# Patient Record
Sex: MALE | Race: WHITE | Employment: FULL TIME | URBAN - METROPOLITAN AREA
[De-identification: names, ages, dates, MRNs, and addresses within clinical notes are randomized per-mention and may not be internally consistent; named-entity substitution may affect disease eponyms.]

---

## 2020-04-10 ENCOUNTER — VIRTUAL VISIT (OUTPATIENT)
Dept: NEUROLOGY | Age: 70
End: 2020-04-10

## 2020-04-10 VITALS — WEIGHT: 270 LBS | HEIGHT: 74 IN | BODY MASS INDEX: 34.65 KG/M2

## 2020-04-10 DIAGNOSIS — G25.0 BENIGN ESSENTIAL TREMOR: Primary | ICD-10-CM

## 2020-04-10 RX ORDER — LOSARTAN POTASSIUM 50 MG/1
50 TABLET ORAL DAILY
COMMUNITY

## 2020-04-10 RX ORDER — MULTIVIT WITH MINERALS/HERBS
1 TABLET ORAL DAILY
COMMUNITY

## 2020-04-10 RX ORDER — CLINDAMYCIN HYDROCHLORIDE 150 MG/1
150 CAPSULE ORAL EVERY 6 HOURS
COMMUNITY

## 2020-04-10 RX ORDER — METFORMIN HYDROCHLORIDE 500 MG/1
750 TABLET ORAL
COMMUNITY

## 2020-04-10 RX ORDER — MELOXICAM 7.5 MG/1
7.5 TABLET ORAL 2 TIMES DAILY
COMMUNITY

## 2020-04-10 RX ORDER — PRASTERONE (DHEA) 50 MG
50 TABLET ORAL
COMMUNITY

## 2020-04-10 RX ORDER — VITAMIN E 1000 UNIT
1000 CAPSULE ORAL
COMMUNITY

## 2020-04-10 NOTE — PROGRESS NOTES
Chief Complaint   Patient presents with    Tremors       Referred by: Dr Darcie MUÑOZ    Mr. Pablo Ott is a 77-year-old gentleman with a history of degenerative spine disease who is here to discuss tremor. He has had a right hand tremor going on 5 years that he thinks is getting a little bit worse such that his handwriting is difficult and sometimes he spills liquids. No radiation to the left side or leg or head. No drooling or constipation. No hallucinations. 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 Right arm has a history of carpal tunnel cubital tunnel release. No falls. -------------------------------------------------------------------------------------------------------------------------------------------------------------------------------------------------------------------------------------------------  This is a telemedicine visit that was performed with the originating site at the patients's HOME and the distant site at [facility] outpatient Neurology clinic at [facility] in [place], [place]. Verbal consent to participate in the video visit was obtained and 2 factor identification was completed (name and  verified). This visit occurred during the [address] emergency and a telemedicine visit was done in lieu of an office visit. I discussed with the patient the nature of our telemedicine visit is that:  - I would evaluate the patient and recommend diagnostic and treatment based on my assessment  - Our sessions are not being recorded and that personal health information is protected  - Our team will provide follow-up care in person if when the patient needs it. Review of Systems   Gastrointestinal: Negative for constipation. Musculoskeletal: Negative for falls. Neurological: Positive for tremors. Psychiatric/Behavioral: Negative for hallucinations. All other systems reviewed and are negative.       Past Medical History: Diagnosis Date    Essential hypertension     borderline     Family History   Problem Relation Age of Onset    Cancer Mother     Coronary Artery Disease Neg Hx      Social History     Socioeconomic History    Marital status:      Spouse name: Not on file    Number of children: Not on file    Years of education: Not on file    Highest education level: Not on file   Occupational History    Not on file   Social Needs    Financial resource strain: Not on file    Food insecurity     Worry: Not on file     Inability: Not on file    Transportation needs     Medical: Not on file     Non-medical: Not on file   Tobacco Use    Smoking status: Former Smoker     Last attempt to quit: 1984     Years since quittin.2    Smokeless tobacco: Never Used   Substance and Sexual Activity    Alcohol use: Yes     Alcohol/week: 2.0 standard drinks     Types: 2 Cans of beer per week     Comment: every other day.  Drug use: No    Sexual activity: Not Currently   Lifestyle    Physical activity     Days per week: Not on file     Minutes per session: Not on file    Stress: Not on file   Relationships    Social connections     Talks on phone: Not on file     Gets together: Not on file     Attends Evangelical service: Not on file     Active member of club or organization: Not on file     Attends meetings of clubs or organizations: Not on file     Relationship status: Not on file    Intimate partner violence     Fear of current or ex partner: Not on file     Emotionally abused: Not on file     Physically abused: Not on file     Forced sexual activity: Not on file   Other Topics Concern    Not on file   Social History Narrative    Not on file     Current Outpatient Medications   Medication Sig    b complex vitamins tablet Take 1 Tab by mouth daily.  multivit-min/FA/lycopen/lutein (CENTRUM SILVER MEN PO) Take  by mouth.  prasterone, dhea, (DHEA) 50 mg tab Take 50 mg by mouth.     MAGNESIUM CARBONATE PO Take  by mouth.  meloxicam (MOBIC) 7.5 mg tablet Take 7.5 mg by mouth two (2) times a day.  glucosamine/chondr bustillos A sod (OSTEO BI-FLEX PO) Take  by mouth.  ascorbic acid, vitamin C, (Vitamin C) 1,000 mg tablet Take 1,000 mg by mouth.  cholecalciferol, vitamin D3, (VITAMIN D3 PO) Take  by mouth.  metFORMIN (GLUCOPHAGE) 500 mg tablet Take 750 mg by mouth daily (with breakfast). METFORMIN 750MG    losartan (COZAAR) 50 mg tablet Take 50 mg by mouth daily.  clindamycin (CLEOCIN) 150 mg capsule Take 150 mg by mouth every six (6) hours. USES BEFORE DENTAL PROCEDURES    Fish Oil-Omega-3 Fatty Acids (FISH OIL) 360-1,200 mg cap Take 4 Tabs by mouth daily.  magnesium 250 mg Tab Take 1 Tab by mouth daily.  potassium gluconate 550 mg Tab Take 1 Tab by mouth daily.  levothyroxine (SYNTHROID) 75 mcg tablet Take 75 mcg by mouth daily.  coenzyme Q-10 (CO Q-10) 200 mg capsule Take  by mouth daily.  testosterone enanthate 200 mg/mL Syrg 200 mg by IntraMUSCular route every fourteen (14) days.  traMADol (ULTRAM) 50 mg tablet Take 50 mg by mouth daily as needed.  diclofenac EC (VOLTAREN) 75 mg EC tablet Take 75 mg by mouth two (2) times a day.  loratadine (CLARITIN) 10 mg tablet Take 10 mg by mouth daily.  HYDROcodone-acetaminophen 5-500 mg cap Take 1 Tab by mouth nightly as needed.  OTHER Ketoprofen 20 %, Tramadol 5% Baclofen 3 % Cyclobenzaprine 2 % lotion once daily as needed     No current facility-administered medications for this visit. Allergies   Allergen Reactions    Penicillins Swelling     Pt states this is no longer true. Neurologic Exam     Mental Status   Oriented to person, place, and time. Good volume of speech     Cranial Nerves   Cranial nerves II through XII intact. Motor Exam     Strength   Strength 5/5 throughout.      Sensory Exam   Light touch normal.     Gait, Coordination, and Reflexes     Gait  Gait: normal (Non-shuffling)    Physical Exam Constitutional: He is oriented to person, place, and time. He appears well-developed and well-nourished. Musculoskeletal:      Comments: Very arthritic fingers more on the right   Neurological: He is oriented to person, place, and time. He has normal strength. Gait normal.   Psychiatric: His behavior is normal.     Visit Vitals  Ht 6' 2\" (1.88 m)   Wt 122.5 kg (270 lb)   BMI 34.67 kg/m²       No results found for: WBC, WBCT, WBCPOC, HGB, HGBPOC, HCT, HCTPOC, PLT, PLTPOC, MCV, MCVPOC, HGBEXT, HCTEXT, PLTEXT  Lab Results   Component Value Date/Time    Creatinine (POC) 0.9 12/05/2015 09:47 AM      No results found for: CHOL, CHOLPOCT, HDL, LDL, LDLC, LDLCPOC, LDLCEXT, TRIGL, TGLPOCT, CHHD, CHHDX  No results found for: GPT, ALTPOC, ALT, SGOT, ASTPOC, GGT, AP, APIT, APX, CBIL, TBIL, TBILI, ALB, ALBPOC, TP, NH3, NH4, INR, INREXT, PTP, PTINR, PTEXT, PLT, PLTPOC, HCABQL, HBSAG, AFP, INREXT, PTEXT, PLTEXT       CT Results (maximum last 3): No results found for this or any previous visit. MRI Results (maximum last 3): Results from [place] encounter on 12/05/15   MRI LUMB SPINE W WO CONT    Narrative **Final Report**       ICD Codes / Adm. Diagnosis: 724.02  V45.89 / Spinal stenosis, lumbar region    Other postprocedural status(  Examination:  MR AMANDA Quinn AND WO CON  - [id number] - Dec  5 2015 10:00AM  Accession No:  [id number]  Reason:  lumbar stenosis s/p lumbar laminectomy      REPORT:  Indication: Previous lumbar decompression at L4-L5. Evaluating for residual   stenosis    Exam: MRI of the lumbar spine. Sequences include sagittal and axial T1 and   T2-weighted images. Sagittal STIR. Coronal T2    Comparisons: July 18, 2015    Contrast: After the intravenous administration of 10 mL of Gadavist sagittal   and axial T1-weighted images were obtained. Findings: There is a levoconvex scoliosis of the lumbar spine with slight   anterolisthesis of L4 on L5 unchanged.  There is multilevel endplate   degenerative change. Patient is post right L4 laminectomy. Cord terminus is   within normal limits. Postoperative collections are noted posterior to L4   and L5 within the laminectomy defect, not unexpected. T12-L1: There is degeneration of this disc with disc height loss and a small   disc bulge. There are left-sided facet degenerative changes with thickening   of the ligamentum flavum. There is narrowing of the left subarticular zone   with mild to moderate left foraminal narrowing. No change    L1-L2: There is disc height loss with degeneration of this disc and a   diffuse disc bulge. There are facet degenerative changes. Canal stenosis is   mild with narrowing of the left subarticular zone. There is mild to moderate   left foraminal narrowing. No change    L2-L3: There is near-complete disc height loss with degeneration of this   disc and a diffuse disc bulge. There is a right foraminal extrusion   extending cranially. There are bilateral facet degenerative change with   thickening of the ligamentum flavum. Canal stenosis is mild to moderate with   significant narrowing of the right greater than left subarticular zones. There is mild to moderate left and moderate right foraminal narrowing. Unchanged    L3-L4: This level has been decompressed. There is no residual canal   stenosis. There is residual disc material within the foramen. There are   facet degenerative changes. . There is severe right and moderate left   foraminal narrowing. No change    L4-L5: There is been interval decompression of this level. Diffuse disc   bulge with small amount of extruded disc material extending cranially   unchanged. Patient is post right laminectomy with resection of the   ligamentum flavum. There is residual facet arthropathy left greater than   right. Overall degree of canal stenosis is markedly improved with mild   residual narrowing of the canal. There is severe right and moderate to   severe left foraminal narrowing.     L5-S1: There are bilateral facet degenerative changes with a small disc   bulge. Small left foraminal extrusion extending cranially unchanged. High   signal structure within the right foramen is not visualized on the current   examination. There is narrowing of the subarticular zones left greater than   right without significant canal stenosis. Severe bilateral foraminal   narrowing unchanged. IMPRESSION:  1. Interval decompression at L4-L5 with mild residual canal stenosis. Remaining lumbar disc levels are unchanged without significant interval   progression of stenosis or degenerative change              Signing/Reading Doctor: Zeke Kam ([id number])    Approved: Zeke Kam ([id number])  Dec  7 2015  8:44AM                               Results from Hospital Encounter encounter on 07/18/15   MRI LUMB SPINE W WO CONT    Narrative **Final Report**       ICD Codes / Adm. Diagnosis: 724.02   / Spinal stenosis, lumbar region    Examination:  MR AMANDA Martel AND SOHEILA CON  - [id number] - Jul 18 2015 10:20AM  Accession No:  [id number]  Reason:  lumbar stenosis s/o endoscopic decompression      REPORT:  Indication: Lumbar stenosis post endoscopic decompression    Exam: MRI of the lumbar spine. Sequences include sagittal and axial T1 and   T2-weighted images. Sagittal STIR. Comparisons: November 15, 2014    Contrast: After the intravenous administration of 10 mL of Gadavist sagittal   T1 with and without fat saturation and axial T1-weighted images with fat   saturation were obtained. Findings: There is an S-shaped scoliosis of the lumbar spine. There is   multilevel endplate degenerative change. Patient is post right L3   laminectomy. Cord termination is normal posterior to the L1-L2. While the   patient has been decompressed at L3-L4 there is residual clumping of the   nerve roots however there is significant stenosis at L2-3 and L4-5. A mild   component of arachnoiditis cannot be excluded.  Paraspinous soft tissues are within normal limits. T12-L1: There is degeneration of this disc with a diffuse disc bulge. This   is asymmetric to the left. Canal stenosis is mild with narrowing of the left   subarticular zone with mild to moderate left foraminal narrowing. No change    L1-L2: There is degeneration of this disc with near-complete disc height   loss and a diffuse disc bulge. Canal stenosis is mild. There is mild to   moderate left foraminal narrowing. No interval change    L2-L3: There is disc height loss with degeneration of this disc with a   diffuse disc bulge. There are bilateral facet degenerative change with   thickening of the ligamentum flavum. Canal stenosis is mild with significant   bilateral subarticular zone stenosis. There is a superimposed right   paracentral disc extrusion extending cranially. This has extended into the   vertebral body and does not significantly result in lateral recess stenosis   posterior to L2. This is slightly more prominent than on the prior study. Overall canal stenosis is mild to moderate with narrowing of the bilateral   subarticular zones. There is moderate right and mild to moderate left   foraminal narrowing. Overall degree of stenosis unchanged    L3-L4: This level has been decompressed without residual canal narrowing. There is complete disc height loss with no significant central disc bulge. There is disc material extending into the inferior margin of the foramen   bilaterally and residual facet arthropathy. There is severe right and   moderate left foraminal narrowing. This is unchanged    L4-L5: There is a diffuse disc bulge with bilateral facet degenerative   change. Canal stenosis is moderate to severe with narrowing of the bilateral   subarticular zones. There is severe right and moderate to severe left   foraminal narrowing. No change    L5-S1: There is significant bilateral facet degenerative changes with   associated facet effusions.  There is degeneration of this disc with a small   disc bulge. There is a left foraminal extrusion with cranial extension   unchanged. There is a sliver of high signal extending posterior to the nerve   root within the right foramen. This may represent a small synovial cyst best   seen on sagittal image 3-3. This has increased in the interval as has the   facet effusion. Canal stenosis is minimal with narrowing of the bilateral   subarticular zones right greater than left. There is severe right and severe   left foraminal narrowing        IMPRESSION:  1. Postoperative changes at L3 without residual canal stenosis. 2. Progression of facet effusions L5-S1 with small right foraminal synovial   cyst resulting in progressive right foraminal stenosis at this level. 3. Canal stenosis most significant at L4-L5 with moderate to severe   narrowing of the canal unchanged              Signing/Reading Doctor: Irais Bradley ([id number])    Approved: Irais Bradley ([id number])  Jul 20 2015  8:33AM                               Results from Hospital Encounter encounter on 11/15/14   MRI LUMB SPINE WO CONT    Narrative **Final Report**       ICD Codes / Adm. Diagnosis: 780.96  729.2 / Generalized pain  Neuralgia,   neuritis, and radic  Examination:  MR L SPINE WO CON  - [id number] - Nov 15 2014  1:56PM  Accession No:  [id number]  Reason:  Pain /Radiculitis      REPORT:  INDICATION: Low back pain with pain through the buttocks on the outside of   the leg down to the right foot. Losing control of right leg and foot. Numbness in both feet. Radiculitis 724.4, pain 724.2  TECHNIQUE: Sagittal T1, T2, STIR and axial T1 and T2 weighted images of the   lumbar spine were obtained. COMPARISON: MRI lumbar spine 11/21/08  FINDINGS:  The distal spinal cord has normal contour and signal.  Interval progression of degenerative scoliosis. T12-L1: Chronic loss of disc space height with disc bulging and endplate   hypertrophy. Mild left facet arthrosis.  No significant spinal stenosis. Mild   left foramina stenosis. L1-L2:  Chronic near total loss of disc space height. Endplate osteophyte   formation. Minimal/mild spinal stenosis with mild left greater than right   foramina stenosis. L2-L3:  Broad-based posterior disc protrusion and endplate osteophyte   formation. Moderate facet arthrosis and hypertrophy. Mild/moderate spinal   stenosis with at least mild bilateral foramina stenosis. Mild progression   since 2008. L3-L4:  Near total loss of disc space height with chronic degenerative   endplate changes. Slight retrolisthesis of L3 on L4. Advanced chronic right   greater than left facet arthrosis. Moderate central stenosis greater in the   right subarticular zone with severe right foramina stenosis. Moderately   severe left foramina stenosis. Interval progression of degenerative changes   since 2008. L4-L5:  Chronic near total loss of disc space height with endplate   osteophyte formation. Slight anterolisthesis L4 on L5. Chronic facet   osteoarthrosis and hypertrophy. Moderate to moderately severe spinal   stenosis. Right greater than left foramina stenosis. Interval progression   since 2008. L5-S1:  Broad-based posterior disc bulge/protrusion and endplate osteophyte   formation greater on the left extending into the left foramen. Moderate   facet arthrosis and hypertrophy. Mild central stenosis. Severe left foramina   stenosis. Moderate right foramina stenosis. Mild interval progression. IMPRESSION:   1. Interval progression of multilevel facet arthrosis and degenerative disc   changes since 2008. 2. L4-L5 moderately severe spinal stenosis and right greater than left   foramina stenosis. 3. L3-L4 at least moderate stenosis with right greater than left foramina   stenosis. 4. L2-L3 mild/moderate stenosis and foramina stenosis. 5. L5-S1 left greater than right foramina stenosis.               Signing/Reading Doctor: Lety Florian ([id number])    Approved: Lety Florian ([id number])  Nov 16 2014  4:12PM                                   PET Results (maximum last 3): No results found for this or any previous visit. Assessment and Plan   Diagnoses and all orders for this visit:    1. Benign essential tremor      15-year-old gentleman with a right hand tremor that appears consistent with essential.  It does not display typical parkinsonian features nor does his clinical history suggest such. He tends to have some improvement with a little bit of alcohol. Tremor is worse when he is fatigued which is consistent with benign type. We talked at length about medications. He had been on primidone before but had side effects. I would recommend that unless he truly needs medication to defer. There are significant side effects associated and if his tremor gets worse we can always consider beta-blockers or reintroducing primidone slowly. He agrees with holding off for now. I would like to reevaluate him  after the new year in the office. Questions answered in detail. If anything changes let us know. I reviewed and decided to continue the current medications. This clinical note was dictated with an electronic dictation software that can make unintentional errors. If there are any questions, please contact me directly for clarification. A notice of this visit/encounter being completed has been sent electronically to the patient's PCP and/or referring provider.      2 [facility], [facility] [address]  Diplomate KARLY

## 2020-04-10 NOTE — LETTER
4/10/2020 Patient:  Angie Morales YOB: 1950 Date of Visit: 4/10/2020 Dear Hilda Masters DO 
[address] [facility] [place] [address] [id number] VIA Facsimile: [phone]: I was requested by Terry Whaley DO to evaluate Mr. Angie Morales  for Chief Complaint Patient presents with  Tremors Dylan Moreira I am recommending the following:  
 
Diagnoses and all orders for this visit: 1. Benign essential tremor 
 
 
 
---------------------------------------------------------------------------------------------------------------------- Below is my encounter: Chief Complaint Patient presents with  Tremors Referred by: Dr Alyssa MUÑOZ Mr. Navin Camilo is a 75-year-old gentleman with a history of degenerative spine disease who is here to discuss tremor. He has had a right hand tremor going on 5 years that he thinks is getting a little bit worse such that his handwriting is difficult and sometimes he spills liquids. No radiation to the left side or leg or head. No drooling or constipation. No hallucinations. 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 Right arm has a history of carpal tunnel cubital tunnel release. No falls. ------------------------------------------------------------------------------------------------------------------------------------------------------------------------------------------------------------------------------------------------- This is a telemedicine visit that was performed with the originating site at the patients's HOME and the distant site at [facility] outpatient Neurology clinic at [facility] in [place], [place]. Verbal consent to participate in the video visit was obtained and 2 factor identification was completed (name and  verified). This visit occurred during the [address] emergency and a telemedicine visit was done in lieu of an office visit. I discussed with the patient the nature of our telemedicine visit is that: - I would evaluate the patient and recommend diagnostic and treatment based on my assessment - Our sessions are not being recorded and that personal health information is protected - Our team will provide follow-up care in person if when the patient needs it. Review of Systems Gastrointestinal: Negative for constipation. Musculoskeletal: Negative for falls. Neurological: Positive for tremors. Psychiatric/Behavioral: Negative for hallucinations. All other systems reviewed and are negative. Past Medical History:  
Diagnosis Date  Essential hypertension   
 borderline Family History Problem Relation Age of Onset  Cancer Mother  Coronary Artery Disease Neg Hx Social History Socioeconomic History  Marital status:  Spouse name: Not on file  Number of children: Not on file  Years of education: Not on file  Highest education level: Not on file Occupational History  Not on file Social Needs  Financial resource strain: Not on file  Food insecurity Worry: Not on file Inability: Not on file  Transportation needs Medical: Not on file Non-medical: Not on file Tobacco Use  Smoking status: Former Smoker Last attempt to quit: 1984 Years since quittin.2  Smokeless tobacco: Never Used Substance and Sexual Activity  Alcohol use: Yes Alcohol/week: 2.0 standard drinks Types: 2 Cans of beer per week Comment: every other day.  Drug use: No  
 Sexual activity: Not Currently Lifestyle  Physical activity Days per week: Not on file Minutes per session: Not on file  Stress: Not on file Relationships  Social connections Talks on phone: Not on file Gets together: Not on file Attends Jainism service: Not on file Active member of club or organization: Not on file Attends meetings of clubs or organizations: Not on file Relationship status: Not on file  Intimate partner violence Fear of current or ex partner: Not on file Emotionally abused: Not on file Physically abused: Not on file Forced sexual activity: Not on file Other Topics Concern  Not on file Social History Narrative  Not on file Current Outpatient Medications Medication Sig  b complex vitamins tablet Take 1 Tab by mouth daily.  multivit-min/FA/lycopen/lutein (CENTRUM SILVER MEN PO) Take  by mouth.  prasterone, dhea, (DHEA) 50 mg tab Take 50 mg by mouth.  MAGNESIUM CARBONATE PO Take  by mouth.  meloxicam (MOBIC) 7.5 mg tablet Take 7.5 mg by mouth two (2) times a day.  glucosamine/chondr bustillos A sod (OSTEO BI-FLEX PO) Take  by mouth.  ascorbic acid, vitamin C, (Vitamin C) 1,000 mg tablet Take 1,000 mg by mouth.  cholecalciferol, vitamin D3, (VITAMIN D3 PO) Take  by mouth.  metFORMIN (GLUCOPHAGE) 500 mg tablet Take 750 mg by mouth daily (with breakfast). METFORMIN 750MG  losartan (COZAAR) 50 mg tablet Take 50 mg by mouth daily.  clindamycin (CLEOCIN) 150 mg capsule Take 150 mg by mouth every six (6) hours. USES BEFORE DENTAL PROCEDURES  
 Fish Oil-Omega-3 Fatty Acids (FISH OIL) 360-1,200 mg cap Take 4 Tabs by mouth daily.  magnesium 250 mg Tab Take 1 Tab by mouth daily.  potassium gluconate 550 mg Tab Take 1 Tab by mouth daily.  levothyroxine (SYNTHROID) 75 mcg tablet Take 75 mcg by mouth daily.  coenzyme Q-10 (CO Q-10) 200 mg capsule Take  by mouth daily.  testosterone enanthate 200 mg/mL Syrg 200 mg by IntraMUSCular route every fourteen (14) days.  traMADol (ULTRAM) 50 mg tablet Take 50 mg by mouth daily as needed.  diclofenac EC (VOLTAREN) 75 mg EC tablet Take 75 mg by mouth two (2) times a day.  loratadine (CLARITIN) 10 mg tablet Take 10 mg by mouth daily.  HYDROcodone-acetaminophen 5-500 mg cap Take 1 Tab by mouth nightly as needed.  OTHER Ketoprofen 20 %, Tramadol 5% Baclofen 3 % Cyclobenzaprine 2 % lotion once daily as needed No current facility-administered medications for this visit. Allergies Allergen Reactions  Penicillins Swelling Pt states this is no longer true. Neurologic Exam  
 
Mental Status Oriented to person, place, and time. Good volume of speech Cranial Nerves Cranial nerves II through XII intact. Motor Exam  
 
Strength Strength 5/5 throughout. Sensory Exam  
Light touch normal.  
 
Gait, Coordination, and Reflexes Gait Gait: normal (Non-shuffling) Physical Exam  
Constitutional: He is oriented to person, place, and time. He appears well-developed and well-nourished. Musculoskeletal:  
   Comments: Very arthritic fingers more on the right Neurological: He is oriented to person, place, and time. He has normal strength. Gait normal.  
Psychiatric: His behavior is normal.  
 
Visit Vitals Ht 6' 2\" (1.88 m) Wt 122.5 kg (270 lb) BMI 34.67 kg/m² No results found for: WBC, WBCT, WBCPOC, HGB, HGBPOC, HCT, HCTPOC, PLT, PLTPOC, MCV, MCVPOC, HGBEXT, HCTEXT, PLTEXT Lab Results Component Value Date/Time Creatinine (POC) 0.9 12/05/2015 09:47 AM  
  
No results found for: CHOL, CHOLPOCT, HDL, LDL, LDLC, LDLCPOC, LDLCEXT, TRIGL, TGLPOCT, CHHD, CHHDX No results found for: GPT, ALTPOC, ALT, SGOT, ASTPOC, GGT, AP, APIT, APX, CBIL, TBIL, TBILI, ALB, ALBPOC, TP, NH3, NH4, INR, INREXT, PTP, PTINR, PTEXT, PLT, PLTPOC, HCABQL, HBSAG, AFP, INREXT, PTEXT, PLTEXT 
  
 
CT Results (maximum last 3): No results found for this or any previous visit. MRI Results (maximum last 3): Results from Hospital Encounter encounter on 12/05/15 MRI LUMB SPINE W WO CONT Narrative **Final Report** 
  
 
ICD Codes / Adm. Diagnosis: 724.02  V45.89 / Spinal stenosis, lumbar region Other postprocedural status( 
Examination:  MR Rubia Navarro CON  - [id number] - Dec  5 2015 10:00AM 
Accession No:  [id number] Reason:  lumbar stenosis s/p lumbar laminectomy REPORT: 
Indication: Previous lumbar decompression at L4-L5. Evaluating for residual  
stenosis Exam: MRI of the lumbar spine. Sequences include sagittal and axial T1 and  
T2-weighted images. Sagittal STIR. Coronal T2 Comparisons: July 18, 2015 Contrast: After the intravenous administration of 10 mL of Gadavist sagittal  
and axial T1-weighted images were obtained. Findings: There is a levoconvex scoliosis of the lumbar spine with slight  
anterolisthesis of L4 on L5 unchanged. There is multilevel endplate  
degenerative change. Patient is post right L4 laminectomy. Cord terminus is  
within normal limits. Postoperative collections are noted posterior to L4  
and L5 within the laminectomy defect, not unexpected. T12-L1: There is degeneration of this disc with disc height loss and a small  
disc bulge. There are left-sided facet degenerative changes with thickening  
of the ligamentum flavum. There is narrowing of the left subarticular zone  
with mild to moderate left foraminal narrowing. No change L1-L2: There is disc height loss with degeneration of this disc and a  
diffuse disc bulge. There are facet degenerative changes. Canal stenosis is  
mild with narrowing of the left subarticular zone. There is mild to moderate  
left foraminal narrowing. No change L2-L3: There is near-complete disc height loss with degeneration of this  
disc and a diffuse disc bulge. There is a right foraminal extrusion  
extending cranially. There are bilateral facet degenerative change with  
thickening of the ligamentum flavum. Canal stenosis is mild to moderate with  
significant narrowing of the right greater than left subarticular zones. There is mild to moderate left and moderate right foraminal narrowing. Unchanged L3-L4: This level has been decompressed. There is no residual canal  
stenosis. There is residual disc material within the foramen. There are  
facet degenerative changes. . There is severe right and moderate left  
foraminal narrowing. No change L4-L5: There is been interval decompression of this level. Diffuse disc  
bulge with small amount of extruded disc material extending cranially  
unchanged. Patient is post right laminectomy with resection of the  
ligamentum flavum. There is residual facet arthropathy left greater than  
right. Overall degree of canal stenosis is markedly improved with mild  
residual narrowing of the canal. There is severe right and moderate to  
severe left foraminal narrowing. L5-S1: There are bilateral facet degenerative changes with a small disc  
bulge. Small left foraminal extrusion extending cranially unchanged. High  
signal structure within the right foramen is not visualized on the current  
examination. There is narrowing of the subarticular zones left greater than  
right without significant canal stenosis. Severe bilateral foraminal  
narrowing unchanged. IMPRESSION: 
1. Interval decompression at L4-L5 with mild residual canal stenosis. Remaining lumbar disc levels are unchanged without significant interval  
progression of stenosis or degenerative change Signing/Reading Doctor: Uriel WELSH ([id number]) Marika Woods ([id number])  Dec  7 2015  8:44AM                      
 
 
   
[place] [facility] [place] MRI LUMB SPINE W WO CONT Narrative **Final Report** 
  
 
ICD Codes / Adm. Diagnosis: 724.02   / Spinal stenosis, lumbar region Examination:  MR Phi Baker  - [id number] - Jul 18 2015 10:20AM 
Accession No:  [id number] Reason:  lumbar stenosis s/o endoscopic decompression REPORT: 
Indication: Lumbar stenosis post endoscopic decompression Exam: MRI of the lumbar spine. Sequences include sagittal and axial T1 and  
T2-weighted images. Sagittal STIR. Comparisons: November 15, 2014 Contrast: After the intravenous administration of 10 mL of Gadavist sagittal  
T1 with and without fat saturation and axial T1-weighted images with fat  
saturation were obtained. Findings: There is an S-shaped scoliosis of the lumbar spine. There is  
multilevel endplate degenerative change. Patient is post right L3  
laminectomy. Cord termination is normal posterior to the L1-L2. While the  
patient has been decompressed at L3-L4 there is residual clumping of the  
nerve roots however there is significant stenosis at L2-3 and L4-5. A mild  
component of arachnoiditis cannot be excluded. Paraspinous soft tissues are  
within normal limits. T12-L1: There is degeneration of this disc with a diffuse disc bulge. This  
is asymmetric to the left. Canal stenosis is mild with narrowing of the left  
subarticular zone with mild to moderate left foraminal narrowing. No change L1-L2: There is degeneration of this disc with near-complete disc height  
loss and a diffuse disc bulge. Canal stenosis is mild. There is mild to  
moderate left foraminal narrowing. No interval change L2-L3: There is disc height loss with degeneration of this disc with a  
diffuse disc bulge. There are bilateral facet degenerative change with  
thickening of the ligamentum flavum. Canal stenosis is mild with significant  
bilateral subarticular zone stenosis. There is a superimposed right  
paracentral disc extrusion extending cranially. This has extended into the  
vertebral body and does not significantly result in lateral recess stenosis  
posterior to L2. This is slightly more prominent than on the prior study. Overall canal stenosis is mild to moderate with narrowing of the bilateral  
subarticular zones. There is moderate right and mild to moderate left foraminal narrowing. Overall degree of stenosis unchanged L3-L4: This level has been decompressed without residual canal narrowing. There is complete disc height loss with no significant central disc bulge. There is disc material extending into the inferior margin of the foramen  
bilaterally and residual facet arthropathy. There is severe right and  
moderate left foraminal narrowing. This is unchanged L4-L5: There is a diffuse disc bulge with bilateral facet degenerative  
change. Canal stenosis is moderate to severe with narrowing of the bilateral  
subarticular zones. There is severe right and moderate to severe left  
foraminal narrowing. No change L5-S1: There is significant bilateral facet degenerative changes with  
associated facet effusions. There is degeneration of this disc with a small  
disc bulge. There is a left foraminal extrusion with cranial extension  
unchanged. There is a sliver of high signal extending posterior to the nerve  
root within the right foramen. This may represent a small synovial cyst best  
seen on sagittal image 3-3. This has increased in the interval as has the  
facet effusion. Canal stenosis is minimal with narrowing of the bilateral  
subarticular zones right greater than left. There is severe right and severe  
left foraminal narrowing IMPRESSION: 
1. Postoperative changes at L3 without residual canal stenosis. 2. Progression of facet effusions L5-S1 with small right foraminal synovial  
cyst resulting in progressive right foraminal stenosis at this level. 3. Canal stenosis most significant at L4-L5 with moderate to severe  
narrowing of the canal unchanged Signing/Reading Doctor: Eamon WELSH ([id number]) Ken Arango ([id number])  Jul 20 2015  8:33AM                      
 
 
   
Results from Hospital Encounter encounter on 11/15/14 MRI LUMB SPINE WO CONT  Narrative **Final Report** 
  
 
 ICD Codes / Adm. Diagnosis: 780.96  729.2 / Generalized pain  Neuralgia,  
neuritis, and radic Examination:  MR L SPINE WO CON  - [id number] - Nov 15 2014  1:56PM 
Accession No:  [id number] Reason:  Pain /Radiculitis REPORT: 
INDICATION: Low back pain with pain through the buttocks on the outside of  
the leg down to the right foot. Losing control of right leg and foot. Numbness in both feet. Radiculitis 724.4, pain 724.2 TECHNIQUE: Sagittal T1, T2, STIR and axial T1 and T2 weighted images of the  
lumbar spine were obtained. COMPARISON: MRI lumbar spine 11/21/08 FINDINGS: 
The distal spinal cord has normal contour and signal. 
Interval progression of degenerative scoliosis. T12-L1: Chronic loss of disc space height with disc bulging and endplate  
hypertrophy. Mild left facet arthrosis. No significant spinal stenosis. Mild  
left foramina stenosis. L1-L2:  Chronic near total loss of disc space height. Endplate osteophyte  
formation. Minimal/mild spinal stenosis with mild left greater than right  
foramina stenosis. L2-L3:  Broad-based posterior disc protrusion and endplate osteophyte  
formation. Moderate facet arthrosis and hypertrophy. Mild/moderate spinal  
stenosis with at least mild bilateral foramina stenosis. Mild progression  
since 2008. L3-L4:  Near total loss of disc space height with chronic degenerative  
endplate changes. Slight retrolisthesis of L3 on L4. Advanced chronic right  
greater than left facet arthrosis. Moderate central stenosis greater in the  
right subarticular zone with severe right foramina stenosis. Moderately  
severe left foramina stenosis. Interval progression of degenerative changes  
since 2008. L4-L5:  Chronic near total loss of disc space height with endplate  
osteophyte formation. Slight anterolisthesis L4 on L5. Chronic facet  
osteoarthrosis and hypertrophy.  Moderate to moderately severe spinal  
 stenosis. Right greater than left foramina stenosis. Interval progression  
since 2008. L5-S1:  Broad-based posterior disc bulge/protrusion and endplate osteophyte  
formation greater on the left extending into the left foramen. Moderate  
facet arthrosis and hypertrophy. Mild central stenosis. Severe left foramina  
stenosis. Moderate right foramina stenosis. Mild interval progression. IMPRESSION:  
1. Interval progression of multilevel facet arthrosis and degenerative disc  
changes since 2008. 2. L4-L5 moderately severe spinal stenosis and right greater than left  
foramina stenosis. 3. L3-L4 at least moderate stenosis with right greater than left foramina  
stenosis. 4. L2-L3 mild/moderate stenosis and foramina stenosis. 5. L5-S1 left greater than right foramina stenosis. Signing/Reading Doctor: Prakash Monroy ([id number]) Fiona Backer ([id number])  Nov 16 2014  4:12PM                      
 
 
   
 
 
PET Results (maximum last 3): No results found for this or any previous visit. Assessment and Plan Diagnoses and all orders for this visit: 1. Benign essential tremor 68-year-old gentleman with a right hand tremor that appears consistent with essential.  It does not display typical parkinsonian features nor does his clinical history suggest such. He tends to have some improvement with a little bit of alcohol. Tremor is worse when he is fatigued which is consistent with benign type. We talked at length about medications. He had been on primidone before but had side effects. I would recommend that unless he truly needs medication to defer. There are significant side effects associated and if his tremor gets worse we can always consider beta-blockers or reintroducing primidone slowly. He agrees with holding off for now. I would like to reevaluate him  after the new year in the office. Questions answered in detail. If anything changes let us know. I reviewed and decided to continue the current medications. This clinical note was dictated with an electronic dictation software that can make unintentional errors. If there are any questions, please contact me directly for clarification. Thank you for giving me the opportunity to assist in the care of Mr. Danna Ann. If you have questions, please do not hesitate to contact me. Sincerely, Concetta Márquez, DO Neurologist 
Brain Injury Medicine Diplomate ABPN

## 2020-04-10 NOTE — PROGRESS NOTES
New Pt, who has a tremor in his right hand. He has carpal tunnel and elbow surgery on this hand/arm.

## 2022-07-10 LAB — HBA1C MFR BLD HPLC: 5.3 %

## 2023-01-14 NOTE — PROGRESS NOTES
[place]: [facility]  [phone]    HPI:   Mr. Sal Valdez is a 73 yo M with HTN, remote tobacco, h/o spinal stenosis, arthritis, seen in the past for chest pains and shortness of breath. He is here now due to episodes of chest discomfort. This occurred a few weeks ago when he had a prolonged spell of chest discomfort that was substernal, lasted several minutes. He went and saw his primary care physician. He does note that he has been under a lot more stress the last six to eight months. His wife was diagnosed with Alzheimer's and he has had to do a lot more in the house and otherwise. He did have an episode of chest pressure as well today. He has had some congestion the last few weeks and did take an antibiotic for this that helped some. He has had two back surgeries over the years since I last saw him. He has not been as active physically because he has been taking care of his wife. He is compensated on exam with clear lungs and no lower extremity edema. His EKG is sinus bradycardia, heart rate of 56, first degree AV block, incomplete right bundle branch block. Assessment and Plan:   1. Unstable angina. Chest pain with typical and atypical features and multiple risk factors; will proceed with a stress test for further evaluation. Due to arthritis, his back and joints, he cannot fully complete a treadmill and will do this Lexiscan. 2. Essential hypertension. Blood pressure is controlled. 3. Mixed hyperlipidemia. 4. History of type 2 diabetes, but this resolved with weight loss. 5. DJD of the back, status post surgeries. 6. Arthritis. He  has a past medical history of Essential hypertension. He has no past medical history of Diabetes ([address] or Hyperlipidemia. All other systems negative except as above.      PE  Vitals:    01/17/23 0827   BP: (!) 154/90   Pulse: 69   Resp: 16   SpO2: 97%   Weight: 273 lb (123.8 kg)   Height: 6' 2\" (1.88 m)      Body mass index is 35.05 kg/m². General appearance - alert, well appearing, and in no distress  Mental status - affect appropriate to mood  Eyes - sclera anicteric, moist mucous membranes  Neck - supple, no JVD  Chest - clear to auscultation, no wheezes, rales or rhonchi  Heart - normal rate, regular rhythm, normal S1, S2, no murmurs, rubs, clicks or gallops  Abdomen - soft, nontender, nondistended, no masses or organomegaly  Extremities - peripheral pulses normal, no pedal edema      Recent Labs:  No results found for: CHOL    No results found for: ZAYRA    No results found for: BUN    No results found for: K    No results found for: HBA1C, RMA7WAYV    No components found for: HGBI,  IHGB,  HGB,  HGBP,  WBHGB    No results found for: PLT, PLTEXT      Reviewed:  Past Medical History:   Diagnosis Date    Essential hypertension     borderline     Social History     Tobacco Use   Smoking Status Former    Types: Cigarettes    Quit date: 1984    Years since quittin.0   Smokeless Tobacco Never     Social History     Substance and Sexual Activity   Alcohol Use Yes    Alcohol/week: 2.0 standard drinks    Types: 2 Cans of beer per week    Comment: every other day. Allergies   Allergen Reactions    Penicillins Swelling     Pt states this is no longer true. Current Outpatient Medications   Medication Sig    testosterone cypionate (DEPOTESTOTERONE CYPIONATE) 200 mg/mL injection INJECT 1 ML (CC) INTRAMUSCULARLY EVERY 2 WEEKS AS DIRECTED    propranoloL (INDERAL) 20 mg tablet Take 20 mg by mouth daily. montelukast (SINGULAIR) 10 mg tablet Take 10 mg by mouth daily. amLODIPine (NORVASC) 5 mg tablet TAKE 1 TABLET BY MOUTH ONCE DAILY . NEED AN APPOINTMENT WITH DOCTOR IN JANUARY    alpha lipoic acid 200 mg cap alpha lipoic acid 200 mg capsule   Take by oral route. b complex vitamins tablet Take 1 Tab by mouth daily. multivit-min/FA/lycopen/lutein (CENTRUM SILVER MEN PO) Take  by mouth.     MAGNESIUM CARBONATE PO Take by mouth. glucosamine/chondr bustillos A sod (OSTEO BI-FLEX PO) Take  by mouth. ascorbic acid, vitamin C, (VITAMIN C) 1,000 mg tablet Take 1,000 mg by mouth. cholecalciferol, vitamin D3, (VITAMIN D3 PO) Take  by mouth.    metFORMIN (GLUCOPHAGE) 500 mg tablet Take 750 mg by mouth daily (with breakfast). METFORMIN 750MG    omega-3 fatty acids-fish oil 360-1,200 mg cap Take 4 Tabs by mouth daily. magnesium 250 mg Tab Take 1 Tab by mouth daily. potassium gluconate 550 mg Tab Take 1 Tab by mouth daily. diclofenac EC (VOLTAREN) 75 mg EC tablet Take 75 mg by mouth two (2) times a day. levothyroxine (SYNTHROID) 75 mcg tablet Take 75 mcg by mouth daily. coenzyme Q-10 (CO Q-10) 200 mg capsule Take  by mouth daily. HYDROcodone-acetaminophen 5-500 mg cap Take 1 Tab by mouth nightly as needed. OTHER Ketoprofen 20 %, Tramadol 5% Baclofen 3 % Cyclobenzaprine 2 % lotion once daily as needed    prasterone, dhea, (DHEA) 50 mg tab Take 50 mg by mouth. (Patient not taking: Reported on 1/17/2023)    meloxicam (MOBIC) 7.5 mg tablet Take 7.5 mg by mouth two (2) times a day. losartan (COZAAR) 50 mg tablet Take 50 mg by mouth daily. (Patient not taking: Reported on 1/17/2023)    clindamycin (CLEOCIN) 150 mg capsule Take 150 mg by mouth every six (6) hours. USES BEFORE DENTAL PROCEDURES (Patient not taking: Reported on 1/17/2023)    loratadine (CLARITIN) 10 mg tablet Take 10 mg by mouth daily. (Patient not taking: Reported on 1/17/2023)    testosterone enanthate 200 mg/mL Syrg 200 mg by IntraMUSCular route every fourteen (14) days. (Patient not taking: Reported on 1/17/2023)    traMADol (ULTRAM) 50 mg tablet Take 50 mg by mouth daily as needed. (Patient not taking: Reported on 1/17/2023)     No current facility-administered medications for this visit.        Sunny Melissa MD  [facility] [place]  [address] [facility] [address]  [address]

## 2023-01-17 ENCOUNTER — OFFICE VISIT (OUTPATIENT)
Dept: CARDIOLOGY CLINIC | Age: 73
End: 2023-01-17
Payer: MEDICARE

## 2023-01-17 VITALS
BODY MASS INDEX: 35.04 KG/M2 | HEART RATE: 69 BPM | DIASTOLIC BLOOD PRESSURE: 90 MMHG | WEIGHT: 273 LBS | HEIGHT: 74 IN | OXYGEN SATURATION: 97 % | RESPIRATION RATE: 16 BRPM | SYSTOLIC BLOOD PRESSURE: 154 MMHG

## 2023-01-17 DIAGNOSIS — I20.0 UNSTABLE ANGINA (HCC): Primary | ICD-10-CM

## 2023-01-17 DIAGNOSIS — I10 BENIGN ESSENTIAL HTN: ICD-10-CM

## 2023-01-17 DIAGNOSIS — E78.2 MIXED HYPERLIPIDEMIA: ICD-10-CM

## 2023-01-17 DIAGNOSIS — R07.9 CHEST PAIN, UNSPECIFIED TYPE: ICD-10-CM

## 2023-01-17 DIAGNOSIS — Z87.891 HISTORY OF TOBACCO USE: ICD-10-CM

## 2023-01-17 RX ORDER — CEPHRADINE 500 MG
CAPSULE ORAL
COMMUNITY

## 2023-01-17 RX ORDER — MONTELUKAST SODIUM 10 MG/1
10 TABLET ORAL DAILY
COMMUNITY
Start: 2022-10-20

## 2023-01-17 RX ORDER — AMLODIPINE BESYLATE 5 MG/1
TABLET ORAL
COMMUNITY
Start: 2022-10-28

## 2023-01-17 RX ORDER — TESTOSTERONE CYPIONATE 200 MG/ML
INJECTION, SOLUTION INTRAMUSCULAR
COMMUNITY
Start: 2022-12-09

## 2023-01-17 RX ORDER — PROPRANOLOL HYDROCHLORIDE 20 MG/1
20 TABLET ORAL DAILY
COMMUNITY
Start: 2022-12-18

## 2023-01-17 NOTE — PATIENT INSTRUCTIONS
You will be scheduled for a Nuclear Stress Test after your appointment today. Nuclear stress testing evaluates blood flow to your heart muscle and assesses cardiac function. There are 2 parts (Rest/Stress) to this procedure and will include either an exercise on a treadmill or an IV administration of a stressing medication called Lexiscan. Your cardiologist will determine which type of testing is best for you. This test can be performed in one day unless it is determined that better quality images will be obtained by performing the test over two days. *Please arrive 15 minutes prior to your appointment time    Test Duration:       -Two day testing will take 2 hours each day. Your second day(resting images) will be scheduled after the first day is completed    Day of testing instructions:    NO CAFFEINE (not even decaffeinated products) 24 HOURS PRIOR TO TESTING. This includes coffee, soda, tea, chocolate, multivitamins, and migraine medication, like Excedrin or Fioricet that contains caffeine. Nothing to eat or drink 4 HOURS prior to testing  NO NICOTINE 12 hours prior to testing  Hold any medications requested by your cardiologist. Otherwise take medications as directed with a few sips of water. If you are unsure you may bring your medications with you to take after instructed by your stressing nurse. It is recommended you hold NONE of your medications prior to your test. DIABETIC PATIENTS: Take half of your insulin with a light meal 4 hours before your test.  Wear comfortable clothes and shoes (Shirts with no metal, shorts or pants, tennis shoes, no heels or flip flops)    IMPORTANT: This testing involves a cardiac tracer ordered specifically for you. If you are unable to make your appointment, please call to cancel/reschedule AT LEAST 24 hours prior to your appointment so your tracer can be cancelled. [phone].

## 2023-01-17 NOTE — LETTER
Patient:  Lianet Yu   YOB: 1950  Date of Visit: 1/17/2023    Nixon David,   [address]  [address]  [address] [facility]  Via Fax: [phone]:    Dear Jann Tate,    Mr. Lianet Yu is a 73 yo M with HTN, remote tobacco, h/o spinal stenosis, arthritis, seen in the past for chest pains and shortness of breath. He is here now due to episodes of chest discomfort. This occurred a few weeks ago when he had a prolonged spell of chest discomfort that was substernal, lasted several minutes. He went and saw his primary care physician. He does note that he has been under a lot more stress the last six to eight months. His wife was diagnosed with Alzheimer's and he has had to do a lot more in the house and otherwise. He did have an episode of chest pressure as well today. He has had some congestion the last few weeks and did take an antibiotic for this that helped some. He has had two back surgeries over the years since I last saw him. He has not been as active physically because he has been taking care of his wife. He is compensated on exam with clear lungs and no lower extremity edema. His EKG is sinus bradycardia, heart rate of 56, first degree AV block, incomplete right bundle branch block. Assessment and Plan:   1. Unstable angina. Chest pain with typical and atypical features and multiple risk factors; will proceed with a stress test for further evaluation. Due to arthritis, his back and joints, he cannot fully complete a treadmill and will do this Lexiscan. 2. Essential hypertension. Blood pressure is controlled. 3. Mixed hyperlipidemia. 4. History of type 2 diabetes, but this resolved with weight loss. 5. DJD of the back, status post surgeries. 6. Arthritis. If you have questions, please do not hesitate to call me.      Sincerely,      Vernon Newman MD

## 2023-01-27 ENCOUNTER — ANCILLARY PROCEDURE (OUTPATIENT)
Dept: CARDIOLOGY CLINIC | Age: 73
End: 2023-01-27
Payer: MEDICARE

## 2023-01-27 VITALS
DIASTOLIC BLOOD PRESSURE: 82 MMHG | HEIGHT: 74 IN | BODY MASS INDEX: 35.04 KG/M2 | SYSTOLIC BLOOD PRESSURE: 130 MMHG | WEIGHT: 273 LBS

## 2023-01-27 DIAGNOSIS — I10 BENIGN ESSENTIAL HTN: ICD-10-CM

## 2023-01-27 DIAGNOSIS — E78.2 MIXED HYPERLIPIDEMIA: ICD-10-CM

## 2023-01-27 DIAGNOSIS — Z87.891 HISTORY OF TOBACCO USE: ICD-10-CM

## 2023-01-27 DIAGNOSIS — I20.0 UNSTABLE ANGINA (HCC): ICD-10-CM

## 2023-01-27 DIAGNOSIS — R07.9 CHEST PAIN, UNSPECIFIED TYPE: ICD-10-CM

## 2023-01-27 LAB
STRESS BASELINE DIAS BP: 82 MMHG
STRESS BASELINE HR: 70 BPM
STRESS BASELINE SYS BP: 130 MMHG
STRESS O2 SAT PEAK: 98 %
STRESS O2 SAT REST: 98 %
STRESS PEAK DIAS BP: 60 MMHG
STRESS PEAK SYS BP: 110 MMHG
STRESS PERCENT HR ACHIEVED: 59 %
STRESS POST PEAK HR: 86 BPM
STRESS RATE PRESSURE PRODUCT: 9460 BPM*MMHG
STRESS TARGET HR: 146 BPM

## 2023-01-27 RX ORDER — TETRAKIS(2-METHOXYISOBUTYLISOCYANIDE)COPPER(I) TETRAFLUOROBORATE 1 MG/ML
40 INJECTION, POWDER, LYOPHILIZED, FOR SOLUTION INTRAVENOUS ONCE
Status: COMPLETED | OUTPATIENT
Start: 2023-01-27 | End: 2023-01-27

## 2023-01-27 RX ADMIN — TETRAKIS(2-METHOXYISOBUTYLISOCYANIDE)COPPER(I) TETRAFLUOROBORATE 26.2 MILLICURIE: 1 INJECTION, POWDER, LYOPHILIZED, FOR SOLUTION INTRAVENOUS at 08:40

## 2023-01-31 ENCOUNTER — APPOINTMENT (OUTPATIENT)
Dept: CARDIOLOGY CLINIC | Age: 73
End: 2023-01-31

## 2023-02-01 ENCOUNTER — TELEPHONE (OUTPATIENT)
Dept: CARDIOLOGY CLINIC | Age: 73
End: 2023-02-01

## 2023-02-01 NOTE — TELEPHONE ENCOUNTER
----- Message from Mirian Dixon MD sent at 1/31/2023  4:43 PM EST -----  Please let pt know stress test was normal. Can follow up as needed basis.  thx

## 2024-02-12 VITALS
TEMPERATURE: 97.9 F | BODY MASS INDEX: 34.97 KG/M2 | OXYGEN SATURATION: 96 % | DIASTOLIC BLOOD PRESSURE: 64 MMHG | HEIGHT: 74 IN | HEART RATE: 73 BPM | WEIGHT: 272.49 LBS | RESPIRATION RATE: 18 BRPM | SYSTOLIC BLOOD PRESSURE: 120 MMHG

## 2024-02-12 PROCEDURE — 29125 APPL SHORT ARM SPLINT STATIC: CPT

## 2024-02-12 PROCEDURE — 99283 EMERGENCY DEPT VISIT LOW MDM: CPT

## 2024-02-13 ENCOUNTER — APPOINTMENT (OUTPATIENT)
Facility: HOSPITAL | Age: 74
End: 2024-02-13
Payer: MEDICARE

## 2024-02-13 ENCOUNTER — HOSPITAL ENCOUNTER (EMERGENCY)
Facility: HOSPITAL | Age: 74
Discharge: HOME | End: 2024-02-13
Attending: STUDENT IN AN ORGANIZED HEALTH CARE EDUCATION/TRAINING PROGRAM
Payer: MEDICARE

## 2024-02-13 DIAGNOSIS — M25.532 LEFT WRIST PAIN: Primary | ICD-10-CM

## 2024-02-13 PROCEDURE — 73110 X-RAY EXAM OF WRIST: CPT

## 2024-02-13 NOTE — ED TRIAGE NOTES
Patient ambulatory to triage from waiting room with concern for left arm pain. Patient reports carrying groceries into home earlier this evening when he felt \"a pop\" along the distal forearm, patient reports he things he \"did something to the tendon\". Patient reports he was going to wait until the morning to be seen but \"when he moved his arm to scratch his head he got a bad cramp further up his forearm\". Patient noted to have full movement of all digits on left hand as well as movement in left wrist.

## 2024-02-13 NOTE — ED NOTES
Patient discharged from the ED by Dain Gonzalez. Diagnosis, medications, precautions and follow-ups were reviewed with the patient/family. Questions were asked and answered prior to departure. Patient departed the ED via walk-out

## 2024-02-14 NOTE — ED PROVIDER NOTES
[facility] EMERGENCY DEPT  EMERGENCY DEPARTMENT ENCOUNTER       Pt Name: Arnav Cason  MRN: [id number]  Birthdate 1950  Date of evaluation: 2024  Provider: Stephanie Marquis DO   PCP: Tam Hancock DO  Note Started: 1:05 PM 24     CHIEF COMPLAINT       Chief Complaint   Patient presents with    Arm Pain     Left arm pain        HISTORY OF PRESENT ILLNESS: 1 or more elements      History From: Patient  None     Arnav Cason is a 73 y.o. male who presents with cc of left arm pain. He states he was carrying groceries into the house when he felt a pop in his left forearm to his left wrist. He states that he feels like the tendon in his wrist popped. He states he was going to follow up with his orthopedic but it cramped when he lifted his arm up to scratch his head. He states that his pain is mostly located in his wrist but is able to flex/extend without difficulty. He has baseline arthritis in both hands.      Nursing Notes were all reviewed and agreed with or any disagreements were addressed in the HPI.       PAST HISTORY     Past Medical History:  Past Medical History:   Diagnosis Date    Essential hypertension     borderline         Past Surgical History:  Past Surgical History:   Procedure Laterality Date    ORTHOPEDIC SURGERY         Family History:  Family History   Problem Relation Age of Onset    Coronary Art Dis Neg Hx     Cancer Mother        Social History:  Social History     Tobacco Use    Smoking status: Former     Current packs/day: 0.00     Types: Cigarettes     Quit date: 1984     Years since quittin.1    Smokeless tobacco: Never   Substance Use Topics    Alcohol use: Yes     Alcohol/week: 2.0 standard drinks of alcohol    Drug use: No       Allergies:  Allergies   Allergen Reactions    Penicillins Swelling     Pt states this is no longer true.       CURRENT MEDICATIONS      Discharge Medication List as of 2024  2:13 AM        CONTINUE these medications which have NOT

## 2025-06-20 NOTE — PROGRESS NOTES
[place]: [place]  [phone]    HPI:   Mr. Arnav Cason is a 75 yo M with HTN, remote tobacco, h/o spinal stenosis, arthritis, seen in the past for chest pains and shortness of breath.      He is here now for preop cardiac evaluation before surgery on his left shoulder with Dr. Malcom Kwan.  Additionally he has had various symptoms. From a symptom standpoint, he has been more easily short of breath with exertion this past year.  He does admit to weight gain and he has been much less active as he was caring for his wife who had severe dementia.  She did pass in October.  He walks with a cane.  He denies any exertional chest pain.  No significant palpitations.  He went to his preop evaluation for his shoulder with Dr. Malcom Kwan in March and they told him he needed to get evaluation for his heart first. On exam, he is compensated with clear lungs.  He does have 1-2+ bilateral lower extremity edema, I/VI systolic murmur, left sternal border  Assessment and Plan:  1. Unstable angina.  Exertional shortness of breath, multiple risk factors; will proceed with an echo and stress test for further evaluation.  Walks with a cane, has severe arthritis and cannot do a treadmill and will do this Lexiscan.  2. Lower extremity edema.  Echo as noted above.  Could be partly venous insufficiency.  3. Essential hypertension.  Blood pressure is controlled.  4. Mixed hyperlipidemia.  5. Type 2 diabetes.  6. DJD of the back status post surgery.  7. Arthritis.      He  has a past medical history of Essential hypertension.    All other systems negative except as above.     PE  Vitals:    06/23/25 1106   BP: (!) 108/58   BP Site: Left Upper Arm   Patient Position: Sitting   BP Cuff Size: Large Adult   Pulse: 80   SpO2: 93%   Weight: 130.2 kg (287 lb)   Height: 1.88 m (6' 2\")    Body mass index is 36.85 kg/m².  General appearance - alert, well appearing, and in no distress  Mental status - affect appropriate to mood  Eyes

## 2025-06-23 ENCOUNTER — ANCILLARY PROCEDURE (OUTPATIENT)
Age: 75
End: 2025-06-23
Payer: MEDICARE

## 2025-06-23 ENCOUNTER — OFFICE VISIT (OUTPATIENT)
Age: 75
End: 2025-06-23
Payer: MEDICARE

## 2025-06-23 VITALS
DIASTOLIC BLOOD PRESSURE: 58 MMHG | OXYGEN SATURATION: 93 % | HEIGHT: 74 IN | WEIGHT: 287 LBS | SYSTOLIC BLOOD PRESSURE: 108 MMHG | BODY MASS INDEX: 36.83 KG/M2 | HEART RATE: 80 BPM

## 2025-06-23 DIAGNOSIS — R94.31 ABNORMAL EKG: ICD-10-CM

## 2025-06-23 DIAGNOSIS — E78.2 MIXED HYPERLIPIDEMIA: ICD-10-CM

## 2025-06-23 DIAGNOSIS — R60.0 BILATERAL LOWER EXTREMITY EDEMA: ICD-10-CM

## 2025-06-23 DIAGNOSIS — I20.0 UNSTABLE ANGINA (HCC): ICD-10-CM

## 2025-06-23 DIAGNOSIS — I10 BENIGN ESSENTIAL HTN: ICD-10-CM

## 2025-06-23 DIAGNOSIS — R06.02 SHORTNESS OF BREATH: Primary | ICD-10-CM

## 2025-06-23 DIAGNOSIS — R06.02 SHORTNESS OF BREATH: ICD-10-CM

## 2025-06-23 PROCEDURE — 99214 OFFICE O/P EST MOD 30 MIN: CPT | Performed by: INTERNAL MEDICINE

## 2025-06-23 PROCEDURE — 1126F AMNT PAIN NOTED NONE PRSNT: CPT | Performed by: INTERNAL MEDICINE

## 2025-06-23 PROCEDURE — 1159F MED LIST DOCD IN RCRD: CPT | Performed by: INTERNAL MEDICINE

## 2025-06-23 PROCEDURE — G2211 COMPLEX E/M VISIT ADD ON: HCPCS | Performed by: INTERNAL MEDICINE

## 2025-06-23 PROCEDURE — 93225 XTRNL ECG REC<48 HRS REC: CPT | Performed by: HOSPITALIST

## 2025-06-23 PROCEDURE — 93010 ELECTROCARDIOGRAM REPORT: CPT | Performed by: INTERNAL MEDICINE

## 2025-06-23 PROCEDURE — 3078F DIAST BP <80 MM HG: CPT | Performed by: INTERNAL MEDICINE

## 2025-06-23 PROCEDURE — 93005 ELECTROCARDIOGRAM TRACING: CPT | Performed by: INTERNAL MEDICINE

## 2025-06-23 PROCEDURE — 1123F ACP DISCUSS/DSCN MKR DOCD: CPT | Performed by: INTERNAL MEDICINE

## 2025-06-23 PROCEDURE — 3074F SYST BP LT 130 MM HG: CPT | Performed by: INTERNAL MEDICINE

## 2025-06-23 RX ORDER — FUROSEMIDE 40 MG/1
40 TABLET ORAL DAILY
COMMUNITY
Start: 2025-05-20

## 2025-06-23 RX ORDER — UBIDECARENONE 30 MG
1 CAPSULE ORAL DAILY
COMMUNITY

## 2025-06-23 RX ORDER — TAMSULOSIN HYDROCHLORIDE 0.4 MG/1
CAPSULE ORAL
COMMUNITY
Start: 2025-06-13

## 2025-06-23 RX ORDER — ESCITALOPRAM OXALATE 5 MG/1
5 TABLET ORAL DAILY
COMMUNITY

## 2025-06-23 ASSESSMENT — PATIENT HEALTH QUESTIONNAIRE - PHQ9
SUM OF ALL RESPONSES TO PHQ QUESTIONS 1-9: 0
SUM OF ALL RESPONSES TO PHQ QUESTIONS 1-9: 0
2. FEELING DOWN, DEPRESSED OR HOPELESS: NOT AT ALL
1. LITTLE INTEREST OR PLEASURE IN DOING THINGS: NOT AT ALL
SUM OF ALL RESPONSES TO PHQ QUESTIONS 1-9: 0
SUM OF ALL RESPONSES TO PHQ QUESTIONS 1-9: 0

## 2025-06-23 NOTE — PATIENT INSTRUCTIONS
You will be scheduled for a Nuclear Stress Test after your appointment today.    Nuclear stress testing evaluates blood flow to your heart muscle and assesses cardiac function. There are 2 parts (Rest/Stress) to this procedure and will include either an exercise on a treadmill or an IV administration of a stressing medication called Lexiscan. Your cardiologist will determine which type of testing is best for you. This test can be performed in one day unless it is determined that better quality images will be obtained by performing the test over two days.     *Please arrive 15 minutes prior to your appointment time    Test Duration:      -Two day testing will take 2 hours each day. Your second day(resting images) will be scheduled after the first day is completed    Day of testing instructions:    NO CAFFEINE (not even decaffeinated products) 24 HOURS PRIOR TO TESTING. This includes coffee, soda, tea, chocolate, multivitamins, and migraine medication, like Excedrin or Fioricet that contains caffeine.  Nothing to eat or drink 4 HOURS prior to testing  NO NICOTINE 12 hours prior to testing  Do not hold medications. DIABETIC PATIENTS: Take half of your insulin with a light meal 4 hours before your test.  Wear comfortable clothes and shoes (Shirts with no metal, shorts or pants, tennis shoes, no heels or flip flops)    IMPORTANT: This testing involves a cardiac tracer ordered specifically for you. If you are unable to make your appointment, please call to cancel/reschedule AT LEAST 24 hours prior to your appointment so your tracer can be cancelled. [phone].

## 2025-06-25 ENCOUNTER — ANCILLARY PROCEDURE (OUTPATIENT)
Age: 75
End: 2025-06-25
Payer: MEDICARE

## 2025-06-25 VITALS
DIASTOLIC BLOOD PRESSURE: 80 MMHG | HEART RATE: 61 BPM | HEIGHT: 74 IN | BODY MASS INDEX: 36.83 KG/M2 | SYSTOLIC BLOOD PRESSURE: 124 MMHG | WEIGHT: 287 LBS

## 2025-06-25 DIAGNOSIS — I10 BENIGN ESSENTIAL HTN: ICD-10-CM

## 2025-06-25 DIAGNOSIS — E78.2 MIXED HYPERLIPIDEMIA: ICD-10-CM

## 2025-06-25 DIAGNOSIS — R60.0 BILATERAL LOWER EXTREMITY EDEMA: ICD-10-CM

## 2025-06-25 DIAGNOSIS — R94.31 ABNORMAL EKG: ICD-10-CM

## 2025-06-25 DIAGNOSIS — R06.02 SHORTNESS OF BREATH: ICD-10-CM

## 2025-06-25 DIAGNOSIS — I20.0 UNSTABLE ANGINA (HCC): ICD-10-CM

## 2025-06-25 PROCEDURE — 78452 HT MUSCLE IMAGE SPECT MULT: CPT | Performed by: INTERNAL MEDICINE

## 2025-06-25 PROCEDURE — A9500 TC99M SESTAMIBI: HCPCS | Performed by: INTERNAL MEDICINE

## 2025-06-25 RX ORDER — REGADENOSON 0.08 MG/ML
0.4 INJECTION, SOLUTION INTRAVENOUS
Status: COMPLETED | OUTPATIENT
Start: 2025-06-25 | End: 2025-06-25

## 2025-06-25 RX ORDER — TETRAKIS(2-METHOXYISOBUTYLISOCYANIDE)COPPER(I) TETRAFLUOROBORATE 1 MG/ML
24.3 INJECTION, POWDER, LYOPHILIZED, FOR SOLUTION INTRAVENOUS
Status: COMPLETED | OUTPATIENT
Start: 2025-06-25 | End: 2025-06-25

## 2025-06-25 RX ADMIN — REGADENOSON 0.4 MG: 0.08 INJECTION, SOLUTION INTRAVENOUS at 13:00

## 2025-06-25 RX ADMIN — TECHNETIUM TC-99M SESTAMIBI 24.3 MILLICURIE: 1 INJECTION INTRAVENOUS at 13:00

## 2025-06-26 ENCOUNTER — RESULTS FOLLOW-UP (OUTPATIENT)
Age: 75
End: 2025-06-26

## 2025-06-26 LAB
ECHO BSA: 2.61 M2
NUC STRESS EJECTION FRACTION: 73 %
STRESS BASELINE DIAS BP: 80 MMHG
STRESS BASELINE HR: 62 BPM
STRESS BASELINE ST DEPRESSION: 0 MM
STRESS BASELINE SYS BP: 124 MMHG
STRESS O2 SAT PEAK: 94 %
STRESS O2 SAT REST: 95 %
STRESS PEAK DIAS BP: 80 MMHG
STRESS PEAK SYS BP: 124 MMHG
STRESS PERCENT HR ACHIEVED: 57 %
STRESS POST PEAK HR: 82 BPM
STRESS RATE PRESSURE PRODUCT: NORMAL BPM*MMHG
STRESS ST DEPRESSION: 0 MM
STRESS TARGET HR: 144 BPM
TID: 0.92

## 2025-06-26 PROCEDURE — A9500 TC99M SESTAMIBI: HCPCS | Performed by: INTERNAL MEDICINE

## 2025-06-26 RX ORDER — TETRAKIS(2-METHOXYISOBUTYLISOCYANIDE)COPPER(I) TETRAFLUOROBORATE 1 MG/ML
25.3 INJECTION, POWDER, LYOPHILIZED, FOR SOLUTION INTRAVENOUS
Status: COMPLETED | OUTPATIENT
Start: 2025-06-26 | End: 2025-06-26

## 2025-06-26 RX ADMIN — TECHNETIUM TC-99M SESTAMIBI 25.3 MILLICURIE: 1 INJECTION INTRAVENOUS at 12:05

## 2025-06-27 ENCOUNTER — TELEPHONE (OUTPATIENT)
Age: 75
End: 2025-06-27

## 2025-06-27 NOTE — TELEPHONE ENCOUNTER
Telephone call made to patient. Two patient identifiers verified. Went over results with patient. Verified understanding. All questions answered.   Teaching performed on afib and blood clots. Pt states that since his wife passing 8 months ago he has been tight on money and will let us know Monday if affordable. Will Send coupon via [employer]  Patient verbalizes understanding of all appts  Future Appointments   Date Time Provider Department Center   6/30/2025  2:00 PM [facility] 4 JUAN NGUYEN [facility]   6/30/2025  3:20 PM Jung Roach MD [facility]   7/24/2025  1:00 PM Simone Morton MD [facility]

## 2025-06-27 NOTE — TELEPHONE ENCOUNTER
Future Appointments   Date Time Provider Department Center   6/30/2025  2:00 PM [facility] 4 JUAN [facility]   6/30/2025  3:20 PM Jung Roach MD [facility]

## 2025-06-27 NOTE — TELEPHONE ENCOUNTER
Sent holter results for Dr. King review. Preliminary Holter results are attached to the monitor order under cardiology tab.     Preliminary Findings     *The predominant rhythm was Atrial Fibrillation/Flutter.   *The Maximum Heart Rate recorded was 151 bpm, 06/24 10:47:32, the Minimum Heart Rate recorded was 24 bpm, 06/24 00:59:56, and the Average Heart Rate was 61 bpm.   *The study included 11 Pauses. The Longest Pause was 4.0s, 06/24 02:31:04.   *There were 38 VE beats with a burden of <1 %. There was 1 occurrence of Ventricular Tachycardia with the Fastest episode 151 bpm, 06/24 10:47:30, and the Longest episode 7 beats, 06/24 10:47:30. *The study included an Atrial Fibrillation/Flutter [place] of 100 % with <1 % in RVR and 56 % in SVR. The longest episode was 23h 59m 59.0s, 06/23 12:53:12, and the Fastest episode was 116 bpm, 06/23 12:53:12.   *There were 2 Patient Triggers

## 2025-06-27 NOTE — TELEPHONE ENCOUNTER
Patient called in to follow up about VM that was left for him to call the nurse back.    Pt ph# [phone]

## 2025-06-29 LAB — ECHO BSA: 2.61 M2

## 2025-06-29 PROCEDURE — 93227 XTRNL ECG REC<48 HR R&I: CPT | Performed by: HOSPITALIST

## 2025-06-30 ENCOUNTER — OFFICE VISIT (OUTPATIENT)
Age: 75
End: 2025-06-30
Payer: MEDICARE

## 2025-06-30 ENCOUNTER — ANCILLARY PROCEDURE (OUTPATIENT)
Age: 75
End: 2025-06-30
Payer: MEDICARE

## 2025-06-30 VITALS
DIASTOLIC BLOOD PRESSURE: 80 MMHG | SYSTOLIC BLOOD PRESSURE: 160 MMHG | WEIGHT: 278 LBS | HEIGHT: 74 IN | HEART RATE: 59 BPM | BODY MASS INDEX: 35.68 KG/M2

## 2025-06-30 VITALS
HEART RATE: 61 BPM | BODY MASS INDEX: 35.42 KG/M2 | DIASTOLIC BLOOD PRESSURE: 58 MMHG | OXYGEN SATURATION: 96 % | WEIGHT: 276 LBS | HEIGHT: 74 IN | SYSTOLIC BLOOD PRESSURE: 122 MMHG

## 2025-06-30 DIAGNOSIS — R06.02 SHORTNESS OF BREATH: ICD-10-CM

## 2025-06-30 DIAGNOSIS — I10 BENIGN ESSENTIAL HTN: ICD-10-CM

## 2025-06-30 DIAGNOSIS — R60.0 BILATERAL LOWER EXTREMITY EDEMA: ICD-10-CM

## 2025-06-30 DIAGNOSIS — Z79.01 CHRONIC ANTICOAGULATION: ICD-10-CM

## 2025-06-30 DIAGNOSIS — E78.2 MIXED HYPERLIPIDEMIA: ICD-10-CM

## 2025-06-30 DIAGNOSIS — I48.92 ATRIAL FIBRILLATION AND FLUTTER (HCC): Primary | ICD-10-CM

## 2025-06-30 DIAGNOSIS — I48.91 ATRIAL FIBRILLATION AND FLUTTER (HCC): Primary | ICD-10-CM

## 2025-06-30 PROCEDURE — 3078F DIAST BP <80 MM HG: CPT | Performed by: INTERNAL MEDICINE

## 2025-06-30 PROCEDURE — 1126F AMNT PAIN NOTED NONE PRSNT: CPT | Performed by: INTERNAL MEDICINE

## 2025-06-30 PROCEDURE — G2211 COMPLEX E/M VISIT ADD ON: HCPCS | Performed by: INTERNAL MEDICINE

## 2025-06-30 PROCEDURE — 99214 OFFICE O/P EST MOD 30 MIN: CPT | Performed by: INTERNAL MEDICINE

## 2025-06-30 PROCEDURE — 1159F MED LIST DOCD IN RCRD: CPT | Performed by: INTERNAL MEDICINE

## 2025-06-30 PROCEDURE — 1123F ACP DISCUSS/DSCN MKR DOCD: CPT | Performed by: INTERNAL MEDICINE

## 2025-06-30 PROCEDURE — 3074F SYST BP LT 130 MM HG: CPT | Performed by: INTERNAL MEDICINE

## 2025-06-30 PROCEDURE — 93306 TTE W/DOPPLER COMPLETE: CPT | Performed by: INTERNAL MEDICINE

## 2025-06-30 RX ORDER — DABIGATRAN ETEXILATE 150 MG/1
150 CAPSULE ORAL 2 TIMES DAILY
Qty: 180 CAPSULE | Refills: 3 | Status: SHIPPED | OUTPATIENT
Start: 2025-06-30 | End: 2025-07-03

## 2025-06-30 ASSESSMENT — PATIENT HEALTH QUESTIONNAIRE - PHQ9
1. LITTLE INTEREST OR PLEASURE IN DOING THINGS: NOT AT ALL
SUM OF ALL RESPONSES TO PHQ QUESTIONS 1-9: 0
2. FEELING DOWN, DEPRESSED OR HOPELESS: NOT AT ALL
SUM OF ALL RESPONSES TO PHQ QUESTIONS 1-9: 0

## 2025-06-30 NOTE — PROGRESS NOTES
[place]: [place]  [phone]    HPI:   Mr. Arnav Cason is a 73 yo M with HTN, remote tobacco, h/o spinal stenosis, arthritis, seen in the past for chest pains and shortness of breath.      He was initially here for preop cardiac evaluation and shortness of breath with risk factors.  Subsequent stress test demonstrate no reversible ischemia.  His echo was overall normal today with preserved LV function.  However, his monitor demonstrated that he was in AFib/flutter with significant pauses, though these were in the evening.  He does have sleep apnea.  From a symptom standpoint, he denies any syncope or presyncope.  He does have baseline shortness of breath.  Occasional chest pressure, but this is nonexertional.  He does bleed or bruise easily.  He does take meloxicam for various aches. He is compensated on exam with clear lungs.  He does have 1-2+ chronic bilateral lower extremity edema, I/VI systolic murmur at the left sternal border.    Assessment and Plan:  1. AFib/flutter.  This is a new diagnosis for him.  He does take propranolol and his heart rate is overall controlled.  He did have significant pauses on his monitor, but it does sound like this is asymptomatic and likely in the setting of sleep apnea.  He does have an appointment with EP and talked briefly about possibility of pacemaker, though does not sound like it is indicated at this time.  Will tentatively have him follow back with me in 2-3 months.  2. Preop cardiac evaluation.  He is stable cardiac-wise and low-to-moderate risk for cardiac complications given his age and comorbidities.  Pradaxa can be held 48 hours prior to surgeries or procedures.  Will send a clearance to Dr. Kwan and his primary care.  For now, he is thinking about holding off on surgery on his shoulder.  3. Chronic anticoagulation.  Will initiate Pradaxa.  Eliquis is cost prohibitive.  He does have various aches and takes NSAIDs regularly/meloxicam.  Will have him see

## 2025-06-30 NOTE — PROGRESS NOTES
1. Have you been to the ER, urgent care clinic since your last visit?  Hospitalized since your last visit?No    2. Have you seen or consulted any other health care providers outside of the [facility] since your last visit?  Include any pap smears or colon screening. No

## 2025-07-01 LAB
ECHO AO ROOT DIAM: 3.9 CM
ECHO AO ROOT INDEX: 1.57 CM/M2
ECHO AV AREA PEAK VELOCITY: 3.2 CM2
ECHO AV AREA VTI: 3.1 CM2
ECHO AV AREA/BSA PEAK VELOCITY: 1.3 CM2/M2
ECHO AV AREA/BSA VTI: 1.2 CM2/M2
ECHO AV MEAN GRADIENT: 5 MMHG
ECHO AV MEAN VELOCITY: 1 M/S
ECHO AV PEAK GRADIENT: 9 MMHG
ECHO AV PEAK VELOCITY: 1.5 M/S
ECHO AV VELOCITY RATIO: 0.87
ECHO AV VTI: 32.1 CM
ECHO BSA: 2.57 M2
ECHO EST RA PRESSURE: 8 MMHG
ECHO LA DIAMETER INDEX: 1.89 CM/M2
ECHO LA DIAMETER: 4.7 CM
ECHO LA TO AORTIC ROOT RATIO: 1.21
ECHO LA VOL A-L A2C: 116 ML (ref 18–58)
ECHO LA VOL A-L A4C: 79 ML (ref 18–58)
ECHO LA VOL BP: 92 ML (ref 18–58)
ECHO LA VOL MOD A2C: 109 ML (ref 18–58)
ECHO LA VOL MOD A4C: 73 ML (ref 18–58)
ECHO LA VOL/BSA BIPLANE: 37 ML/M2 (ref 16–34)
ECHO LA VOLUME AREA LENGTH: 98 ML
ECHO LA VOLUME INDEX A-L A2C: 47 ML/M2 (ref 16–34)
ECHO LA VOLUME INDEX A-L A4C: 32 ML/M2 (ref 16–34)
ECHO LA VOLUME INDEX AREA LENGTH: 39 ML/M2 (ref 16–34)
ECHO LA VOLUME INDEX MOD A2C: 44 ML/M2 (ref 16–34)
ECHO LA VOLUME INDEX MOD A4C: 29 ML/M2 (ref 16–34)
ECHO LV E' LATERAL VELOCITY: 12.41 CM/S
ECHO LV E' SEPTAL VELOCITY: 13.93 CM/S
ECHO LV EDV A2C: 84 ML
ECHO LV EDV A4C: 97 ML
ECHO LV EDV BP: 90 ML (ref 67–155)
ECHO LV EDV INDEX A4C: 39 ML/M2
ECHO LV EDV INDEX BP: 36 ML/M2
ECHO LV EDV NDEX A2C: 34 ML/M2
ECHO LV EF PHYSICIAN: 60 %
ECHO LV EJECTION FRACTION A2C: 58 %
ECHO LV EJECTION FRACTION A4C: 58 %
ECHO LV EJECTION FRACTION BIPLANE: 58 % (ref 55–100)
ECHO LV ESV A2C: 35 ML
ECHO LV ESV A4C: 40 ML
ECHO LV ESV BP: 38 ML (ref 22–58)
ECHO LV ESV INDEX A2C: 14 ML/M2
ECHO LV ESV INDEX A4C: 16 ML/M2
ECHO LV ESV INDEX BP: 15 ML/M2
ECHO LV FRACTIONAL SHORTENING: 22 % (ref 28–44)
ECHO LV INTERNAL DIMENSION DIASTOLE INDEX: 1.81 CM/M2
ECHO LV INTERNAL DIMENSION DIASTOLIC: 4.5 CM (ref 4.2–5.9)
ECHO LV INTERNAL DIMENSION SYSTOLIC INDEX: 1.41 CM/M2
ECHO LV INTERNAL DIMENSION SYSTOLIC: 3.5 CM
ECHO LV IVSD: 1.1 CM (ref 0.6–1)
ECHO LV MASS 2D: 222.6 G (ref 88–224)
ECHO LV MASS INDEX 2D: 89.4 G/M2 (ref 49–115)
ECHO LV POSTERIOR WALL DIASTOLIC: 1.5 CM (ref 0.6–1)
ECHO LV RELATIVE WALL THICKNESS RATIO: 0.67
ECHO LVOT AREA: 3.8 CM2
ECHO LVOT AV VTI INDEX: 0.81
ECHO LVOT DIAM: 2.2 CM
ECHO LVOT MEAN GRADIENT: 3 MMHG
ECHO LVOT PEAK GRADIENT: 7 MMHG
ECHO LVOT PEAK VELOCITY: 1.3 M/S
ECHO LVOT STROKE VOLUME INDEX: 39.7 ML/M2
ECHO LVOT SV: 98.8 ML
ECHO LVOT VTI: 26 CM
ECHO MV A VELOCITY: 0.64 M/S
ECHO MV AREA VTI: 3.6 CM2
ECHO MV E DECELERATION TIME (DT): 141.1 MS
ECHO MV E VELOCITY: 1.2 M/S
ECHO MV E/A RATIO: 1.88
ECHO MV E/E' LATERAL: 9.67
ECHO MV E/E' RATIO (AVERAGED): 9.14
ECHO MV E/E' SEPTAL: 8.61
ECHO MV LVOT VTI INDEX: 1.06
ECHO MV MAX VELOCITY: 1.2 M/S
ECHO MV MEAN GRADIENT: 2 MMHG
ECHO MV MEAN VELOCITY: 0.6 M/S
ECHO MV PEAK GRADIENT: 6 MMHG
ECHO MV VTI: 27.5 CM
ECHO PV MAX VELOCITY: 0.7 M/S
ECHO PV PEAK GRADIENT: 2 MMHG
ECHO RA END SYSTOLIC VOLUME APICAL 4 CHAMBER INDEX BSA: 43 ML/M2
ECHO RA VOLUME: 107 ML
ECHO RV BASAL DIMENSION: 5.1 CM
ECHO RV FREE WALL PEAK S': 14.3 CM/S
ECHO RV TAPSE: 2.9 CM (ref 1.7–?)
ECHO RVOT PEAK GRADIENT: 2 MMHG
ECHO RVOT PEAK VELOCITY: 0.6 M/S

## 2025-07-01 PROCEDURE — 93306 TTE W/DOPPLER COMPLETE: CPT | Performed by: INTERNAL MEDICINE

## 2025-07-02 ENCOUNTER — TELEPHONE (OUTPATIENT)
Age: 75
End: 2025-07-02

## 2025-07-02 NOTE — TELEPHONE ENCOUNTER
Received request for PA for Dabigatran from patient's insurance. PA denied. They would like him try both Xarelto and Eliquis prior to Dabigatran.   Arnav Cason ([place]: [id number])  PA Case ID #: [id number]  Rx #: [id number]  Need Help? Call us at [phone]  Outcome  Denied on July 1 by [employer] 2017 NCPDP  Request Reference Number: [id number]. DABIGATRAN CAP 150MG is denied for not meeting the prior authorization requirement(s). Details of this decision are in the notice attached below or have been faxed to you.

## 2025-07-03 ENCOUNTER — PATIENT MESSAGE (OUTPATIENT)
Age: 75
End: 2025-07-03

## 2025-07-03 DIAGNOSIS — I48.92 ATRIAL FIBRILLATION AND FLUTTER (HCC): Primary | ICD-10-CM

## 2025-07-03 DIAGNOSIS — I48.91 ATRIAL FIBRILLATION AND FLUTTER (HCC): Primary | ICD-10-CM

## 2025-07-03 RX ORDER — DABIGATRAN ETEXILATE 150 MG/1
150 CAPSULE ORAL 2 TIMES DAILY
Qty: 60 CAPSULE | Refills: 5 | Status: SHIPPED | OUTPATIENT
Start: 2025-07-03

## 2025-07-03 NOTE — TELEPHONE ENCOUNTER
Telephone call made to patient. Two Identifiers used.  Explained to patient that his Insurance denied the PA for Pradaxa and they preferred for him to try Xarelto or Eliquis before going to Pradaxa. Patient stated that he can't afford to pay for Eliquis. I looked up the Good RX coupon for Paradaxa and explained to patient at [employer] its $65.52. Patient was okay with that price. I explained to patient that I will sent the GoodRX coupon via [employer]. Patient verbalized understanding and has no further questions.    Future Appointments   Date Time Provider Department Center   7/24/2025  1:00 PM Simone Morton MD [facility]   9/3/2025  1:20 PM Berenice Franz MD [facility]   9/8/2025  1:00 PM Jung Roach MD [facility]

## 2025-07-07 ENCOUNTER — PATIENT MESSAGE (OUTPATIENT)
Age: 75
End: 2025-07-07

## 2025-07-24 ENCOUNTER — OFFICE VISIT (OUTPATIENT)
Age: 75
End: 2025-07-24
Payer: MEDICARE

## 2025-07-24 VITALS
DIASTOLIC BLOOD PRESSURE: 64 MMHG | HEART RATE: 64 BPM | WEIGHT: 273.8 LBS | SYSTOLIC BLOOD PRESSURE: 138 MMHG | OXYGEN SATURATION: 93 % | HEIGHT: 74 IN | BODY MASS INDEX: 35.14 KG/M2

## 2025-07-24 DIAGNOSIS — R06.02 SHORTNESS OF BREATH: ICD-10-CM

## 2025-07-24 DIAGNOSIS — I10 BENIGN ESSENTIAL HTN: ICD-10-CM

## 2025-07-24 DIAGNOSIS — R94.31 ABNORMAL EKG: ICD-10-CM

## 2025-07-24 DIAGNOSIS — E78.2 MIXED HYPERLIPIDEMIA: ICD-10-CM

## 2025-07-24 DIAGNOSIS — Z79.01 CHRONIC ANTICOAGULATION: ICD-10-CM

## 2025-07-24 DIAGNOSIS — I48.3 TYPICAL ATRIAL FLUTTER (HCC): Primary | ICD-10-CM

## 2025-07-24 LAB — ECHO BSA: 2.61 M2

## 2025-07-24 PROCEDURE — 1123F ACP DISCUSS/DSCN MKR DOCD: CPT | Performed by: HOSPITALIST

## 2025-07-24 PROCEDURE — 93010 ELECTROCARDIOGRAM REPORT: CPT | Performed by: HOSPITALIST

## 2025-07-24 PROCEDURE — 1159F MED LIST DOCD IN RCRD: CPT | Performed by: HOSPITALIST

## 2025-07-24 PROCEDURE — 99205 OFFICE O/P NEW HI 60 MIN: CPT | Performed by: HOSPITALIST

## 2025-07-24 PROCEDURE — 93005 ELECTROCARDIOGRAM TRACING: CPT | Performed by: HOSPITALIST

## 2025-07-24 PROCEDURE — 3075F SYST BP GE 130 - 139MM HG: CPT | Performed by: HOSPITALIST

## 2025-07-24 PROCEDURE — 3078F DIAST BP <80 MM HG: CPT | Performed by: HOSPITALIST

## 2025-07-24 PROCEDURE — 1126F AMNT PAIN NOTED NONE PRSNT: CPT | Performed by: HOSPITALIST

## 2025-07-24 RX ORDER — CHLORAL HYDRATE 500 MG
CAPSULE ORAL DAILY
COMMUNITY

## 2025-07-24 RX ORDER — PHENOL 1.4 %
1 AEROSOL, SPRAY (ML) MUCOUS MEMBRANE DAILY
COMMUNITY

## 2025-07-24 RX ORDER — UBIDECARENONE 75 MG
50 CAPSULE ORAL DAILY
COMMUNITY

## 2025-07-24 ASSESSMENT — PATIENT HEALTH QUESTIONNAIRE - PHQ9
SUM OF ALL RESPONSES TO PHQ QUESTIONS 1-9: 0
1. LITTLE INTEREST OR PLEASURE IN DOING THINGS: NOT AT ALL
SUM OF ALL RESPONSES TO PHQ QUESTIONS 1-9: 0
2. FEELING DOWN, DEPRESSED OR HOPELESS: NOT AT ALL

## 2025-07-24 NOTE — PROGRESS NOTES
PATRIA PREP NOTE  Please fill out subjective and AF history below.  ***Dr. Morton will DELETE before signing visit  ===========================================    Primary Cardiologist: Jung Roach MD    He is retired    Arnav Cason presents to electrophysiology clinic for management of Atrial Arrythmias.    His current medical conditions and PMH are detailed below.      Today's visit:  Date: 7/24/2025    ***    Denies palpitations***, dizziness, syncope and fatigue***. Denies chest pain***. Denies shortness of breath, paroxysmal nocturnal dyspnea, orthopnea and lower extremity edema***.      Cardiac Rhythm Testing     All EKGs were personally interpreted by me as below      [employer] Holter 24-hours rhythm monitor.  6/23/2025 12:53 - 6/24/2025 12:53 24hrs     The predominant underlying rhythm is atrial fibrillation with 100% burden with rates ranging from 24 to 116 bpm  -- average 61 bpm.   There were 11 pauses of greater than 3 seconds in duration, limited to night-time hours.  Longest pause of 4.0 seconds on 6/24 at 2:31 AM  Second longest pause of 3.6 seconds on 6/24 at 1:08 AM  Rare PVCs (<1% burden) . There was one episode of  non-sustained VT observed-- 7 beats at 151 bpm.   There were 2 patient triggered event(s) with symptoms of shortness of breath correlated with atrial fibrillation with ventricular rate 60-75 bpm.     Conclusions: Ambulatory ECG Monitoring is abnormal demonstrating: Persistent atrial fibrillation 100% burden with controlled ventricular rates.  Episodes of pauses at nighttime noted as detailed above.  See chart below for full details.        ===================================================================    # Persistent Atrial Fibrillation  Onset: June 2025  Symptoms: ***  Alcohol: ***  DESTINI: uses CPAP  Prior AAD: none  Current Meds/AAD: none  Prior Cardioversion: none  Ablation: none  LA size: mildly dilated by echo 6/30/25    # Anticoagulation  [id number] score of 3 [for HTN and

## 2025-07-24 NOTE — PROGRESS NOTES
Cardiac Electrophysiology OFFICE Consultation Note     Subjective:      Arnav Cason is a pleasant 74 y.o. male.  He is a resident of [facility] [address].    Primary Cardiologist: Jung Roach MD     He is retired     Arnav Cason presents to electrophysiology clinic for management of Atrial Arrythmias.     His current medical conditions and PMH are detailed below.        Today's visit:  Date: 7/24/2025  Incidentally found to have typical atrial flutter.  Remains in triple atrial flutter.  Denies any chest pain or palpitations.  Unclear if he has any fatigue issues.  Will reassess in sinus rhythm    Cardiac Rhythm Testing     All EKGs were personally interpreted by me as below    EKG from 7/20/2025 shows typical atrial flutter with controlled ventricular rate          Assessment:       ICD-10-CM    1. Typical atrial flutter (HCC)  I48.3       2. Chronic anticoagulation  Z79.01 EKG 12 Lead      3. Benign essential HTN  I10 EKG 12 Lead      4. Mixed hyperlipidemia  E78.2 EKG 12 Lead      5. Shortness of breath  R06.02 EKG 12 Lead      6. Abnormal EKG  R94.31 EKG 12 Lead              # Persistent Typical Atrial Flutter with controlled ventricular rate  Onset: June 2025  Symptoms: unclear  Alcohol: 2 drinks a week  DESTINI: uses CPAP  Prior AAD: none  Current Meds/AAD: none  Prior Cardioversion: none  Ablation: none  LA size: mildly dilated by echo 6/30/25     # Anticoagulation  [id number] score of 3 [for HTN and Age-2]  Anticoagulation: Pradaxa 150 mg bid     # Hypertension   BP controlled  - Continue amlodipine     # Pauses  He had some bradycardia and pauses at nighttime which may be related to sleep apnea.  No concerning bradycardia noted.         Impression:  Arnav Cason was diagnosed with typical atrial flutter on EKG in June 2025.  Unclear when he started having this arrhythmia.  Repeat EKG today also shows typical atrial flutter.  2-week monitor showed persistent typical flutter.  There was concern for

## 2025-07-24 NOTE — PATIENT INSTRUCTIONS
--Schedule for right atrial flutter ablation  --Anticoagulation/blood thinner: It is very important for you to keep taking your blood thinner regularly.  Do not miss any doses prior to your procedure.   -- Continue using CPAP regularly        PLEASE be aware that your procedure date/time is tentative and subject to change due to emergency cases.    Any changes to your procedure date/time will be communicated by the hospital staff.      Your AFLUTTER ABLATION procedure has been scheduled for 9/23/25 at 200 PM, at [facility], [address] [place] [address].      Please report to Admitting Department by 1200 PM, or 2 hours prior to your scheduled procedure. Please bring a list of your current medications and medication bottles, if able, to the hospital on this day.  You will be unable to drive after your procedure so please make sure to bring someone with you to your procedure.    You will need to have nothing to eat or drink after midnight, the night prior to your procedure. You may have small sips of water, if needed, to take with your medication.    You will also need to see Dr. Morton's Nurse Practitioner, Ning Robles, in office prior to your procedure. An appointment has been scheduled for 9/3/23 at 100 PM.    You will need labs drawn prior to your procedure. Please go to have this done no AFTER YOUR APPOINTMENT WITH NING HUIZAR.    Medication Instructions:  YOU MAY TAKE YOUR MORNING MEDICATIONS AS SCHEDULED.     After your procedure, you will need to follow up with Ning HUIZAR. Your follow-up appointment has been scheduled for 10/31/25 at 120 PM.

## 2025-08-03 ENCOUNTER — PATIENT MESSAGE (OUTPATIENT)
Age: 75
End: 2025-08-03

## 2025-09-03 ENCOUNTER — OFFICE VISIT (OUTPATIENT)
Age: 75
End: 2025-09-03
Payer: MEDICARE

## 2025-09-03 ENCOUNTER — HOSPITAL ENCOUNTER (OUTPATIENT)
Facility: HOSPITAL | Age: 75
Discharge: HOME | End: 2025-09-06

## 2025-09-03 VITALS
OXYGEN SATURATION: 94 % | RESPIRATION RATE: 16 BRPM | BODY MASS INDEX: 32.85 KG/M2 | HEART RATE: 63 BPM | WEIGHT: 256 LBS | DIASTOLIC BLOOD PRESSURE: 60 MMHG | SYSTOLIC BLOOD PRESSURE: 120 MMHG | HEIGHT: 74 IN

## 2025-09-03 VITALS
SYSTOLIC BLOOD PRESSURE: 120 MMHG | OXYGEN SATURATION: 94 % | HEART RATE: 63 BPM | WEIGHT: 256 LBS | DIASTOLIC BLOOD PRESSURE: 60 MMHG | BODY MASS INDEX: 32.85 KG/M2 | HEIGHT: 74 IN

## 2025-09-03 DIAGNOSIS — I10 BENIGN ESSENTIAL HTN: ICD-10-CM

## 2025-09-03 DIAGNOSIS — I48.3 TYPICAL ATRIAL FLUTTER (HCC): Primary | ICD-10-CM

## 2025-09-03 DIAGNOSIS — Z79.01 CHRONIC ANTICOAGULATION: ICD-10-CM

## 2025-09-03 DIAGNOSIS — I48.91 ATRIAL FIBRILLATION, UNSPECIFIED TYPE (HCC): Primary | ICD-10-CM

## 2025-09-03 PROCEDURE — 1126F AMNT PAIN NOTED NONE PRSNT: CPT

## 2025-09-03 PROCEDURE — 99213 OFFICE O/P EST LOW 20 MIN: CPT

## 2025-09-03 PROCEDURE — 3078F DIAST BP <80 MM HG: CPT

## 2025-09-03 PROCEDURE — 3074F SYST BP LT 130 MM HG: CPT

## 2025-09-03 PROCEDURE — 1159F MED LIST DOCD IN RCRD: CPT

## 2025-09-03 PROCEDURE — 99204 OFFICE O/P NEW MOD 45 MIN: CPT | Performed by: INTERNAL MEDICINE

## 2025-09-03 PROCEDURE — 1123F ACP DISCUSS/DSCN MKR DOCD: CPT

## 2025-09-03 ASSESSMENT — PATIENT HEALTH QUESTIONNAIRE - PHQ9
SUM OF ALL RESPONSES TO PHQ QUESTIONS 1-9: 0
1. LITTLE INTEREST OR PLEASURE IN DOING THINGS: NOT AT ALL
SUM OF ALL RESPONSES TO PHQ QUESTIONS 1-9: 0
2. FEELING DOWN, DEPRESSED OR HOPELESS: NOT AT ALL
SUM OF ALL RESPONSES TO PHQ QUESTIONS 1-9: 0
SUM OF ALL RESPONSES TO PHQ QUESTIONS 1-9: 0

## 2025-09-03 ASSESSMENT — LIFESTYLE VARIABLES
HOW OFTEN DO YOU HAVE A DRINK CONTAINING ALCOHOL: NEVER
HOW MANY STANDARD DRINKS CONTAINING ALCOHOL DO YOU HAVE ON A TYPICAL DAY: PATIENT DOES NOT DRINK